# Patient Record
Sex: MALE | Race: WHITE | NOT HISPANIC OR LATINO | Employment: FULL TIME | URBAN - METROPOLITAN AREA
[De-identification: names, ages, dates, MRNs, and addresses within clinical notes are randomized per-mention and may not be internally consistent; named-entity substitution may affect disease eponyms.]

---

## 2023-06-03 ENCOUNTER — HOSPITAL ENCOUNTER (EMERGENCY)
Facility: CLINIC | Age: 33
Discharge: HOME | End: 2023-06-03
Attending: EMERGENCY MEDICINE | Admitting: EMERGENCY MEDICINE
Payer: COMMERCIAL

## 2023-06-03 VITALS
DIASTOLIC BLOOD PRESSURE: 98 MMHG | TEMPERATURE: 97.2 F | RESPIRATION RATE: 14 BRPM | SYSTOLIC BLOOD PRESSURE: 148 MMHG | WEIGHT: 190 LBS | HEART RATE: 98 BPM

## 2023-06-03 DIAGNOSIS — K62.5 RECTAL BLEEDING: ICD-10-CM

## 2023-06-03 DIAGNOSIS — K59.00 CONSTIPATION, UNSPECIFIED CONSTIPATION TYPE: ICD-10-CM

## 2023-06-03 PROCEDURE — 99282 EMERGENCY DEPT VISIT SF MDM: CPT | Performed by: EMERGENCY MEDICINE

## 2023-06-03 PROCEDURE — 99283 EMERGENCY DEPT VISIT LOW MDM: CPT | Performed by: EMERGENCY MEDICINE

## 2023-06-03 NOTE — ED PROVIDER NOTES
"  History     Chief Complaint   Patient presents with     Rectal Bleeding     HPI  Marco Antonio Jeffries is a 32 year old male who presents to the emergency department secondary to blood in the stool.  He states that he has developed \"medical anxiety \"lately and wants to get everything checked out.  He has a daughter who is 2010 today and he wanted to get this checked out before her party so he could get peace of mind.  He has intermittently had some bright red blood in his stool is always associated with constipation.  He did take some MiraLAX.  The blood in his stool today was after having a hard bowel movement.  He does not have significant rectal pain but believes he may have a hemorrhoid or something like that.  He googled his symptoms which gave him more anxiety so he decided to come in.  He has not been sick with fever chills nausea vomiting etc.  He has not had abdominal distention.  No dysuria or hematuria.  No chronic history of rectal pain and bleeding.  No history of Crohn's disease or ulcerative colitis.  No strong family history of colon cancer.    Allergies:  Allergies   Allergen Reactions     No Known Drug Allergy        Problem List:    There are no problems to display for this patient.       Past Medical History:    Past Medical History:   Diagnosis Date     NONSPECIFIC MEDICAL HISTORY 2000       Past Surgical History:    Past Surgical History:   Procedure Laterality Date     NO HISTORY OF SURGERY         Family History:    Family History   Problem Relation Age of Onset     Heart Disease Paternal Grandmother         CHF (smoker)     Heart Disease Maternal Grandfather      Arthritis Paternal Aunt      Obesity Paternal Aunt         stomach stapled       Social History:  Marital Status:  Single [1]  Social History     Tobacco Use     Smoking status: Never     Tobacco comments:     no parental exposure   Substance Use Topics     Alcohol use: No        Medications:    DILANTIN CAPS 100 MG " OR  FLINTSTONES COMPLETE CHEW   OR  NO ACTIVE MEDICATIONS  TRILEPTAL 300 MG OR TABS          Review of Systems   All other systems reviewed and are negative.      Physical Exam   BP: (!) 148/98  Pulse: 98  Temp: 97.2  F (36.2  C)  Resp: 14  Weight: 86.2 kg (190 lb)      Physical Exam  Vitals and nursing note reviewed.   Constitutional:       General: He is not in acute distress.     Appearance: Normal appearance. He is well-developed. He is not diaphoretic.   HENT:      Head: Normocephalic and atraumatic.      Right Ear: External ear normal.      Left Ear: External ear normal.      Nose: Nose normal.      Mouth/Throat:      Mouth: Mucous membranes are moist.   Eyes:      General: No scleral icterus.  Cardiovascular:      Rate and Rhythm: Normal rate.   Pulmonary:      Effort: Pulmonary effort is normal.   Genitourinary:     Prostate: Normal.      Rectum: Normal.      Comments: Tiny rectal polyp versus tiny external hemorrhoid has not bleeding.  Anus and rectum appear normal.  Rectal exam without any blood.  No masses appreciated.  Musculoskeletal:      Cervical back: Normal range of motion and neck supple.   Skin:     General: Skin is warm and dry.      Findings: No rash.   Neurological:      Mental Status: He is alert and oriented to person, place, and time.         ED Course                 Procedures                No results found for this or any previous visit (from the past 24 hour(s)).    Medications - No data to display    Assessments & Plan (with Medical Decision Making)  32-year-old male with constipation and had some blood in his stool.  He is not systemically ill.  He is does not have chronic symptoms.  This almost certainly is related to constipation.  He has had it in the past and each time it is associated with constipation and a hard bowel movement.  No lightheadedness or dizziness or chronic bleeding therefore blood work not done.  Patient states that he recently had blood work all of which looked  good.  He does not have a family history of colon cancer.  I advised him should this continue or become chronic he would need to have colonoscopy or sigmoidoscopy.  I advised him to drink plenty of water eat a high-fiber diet and avoid constipating foods.     I have reviewed the nursing notes.    I have reviewed the findings, diagnosis, plan and need for follow up with the patient.                New Prescriptions    No medications on file       Final diagnoses:   Rectal bleeding   Constipation, unspecified constipation type       6/3/2023   [facility] EMERGENCY DEPT     Zander Kirby MD  06/03/23 0881

## 2023-06-03 NOTE — ED TRIAGE NOTES
Noted blood in stool this AM. States he has trouble with constipation. He thinks it is probably a Hemorid but would like to be reassured.     Triage Assessment     Row Name 06/03/23 0758       Triage Assessment (Adult)    Airway WDL WDL       Respiratory WDL    Respiratory WDL WDL       Skin Circulation/Temperature WDL    Skin Circulation/Temperature WDL WDL       Cardiac WDL    Cardiac WDL WDL       Peripheral/Neurovascular WDL    Peripheral Neurovascular WDL WDL       Cognitive/Neuro/Behavioral WDL    Cognitive/Neuro/Behavioral WDL WDL

## 2023-06-03 NOTE — DISCHARGE INSTRUCTIONS
Most likely your blood in the stool is related to the constipation and is causing a small fissure with bleeding or perhaps you are bleeding from hemorrhoid.  I only saw a very tiny hemorrhoid near the rectum.  This could have been an anal polyp as well.  It was very small.  Focus on controlling her constipation with drinking plenty of water, eating a high-fiber diet, avoiding constipating foods, taking MiraLAX as needed.  If this continues you may need a colonoscopy or sigmoidoscopy.

## 2024-05-22 ENCOUNTER — HOSPITAL ENCOUNTER (EMERGENCY)
Facility: CLINIC | Age: 34
Discharge: HOME | End: 2024-05-22
Attending: EMERGENCY MEDICINE | Admitting: EMERGENCY MEDICINE
Payer: COMMERCIAL

## 2024-05-22 VITALS
SYSTOLIC BLOOD PRESSURE: 148 MMHG | TEMPERATURE: 98.4 F | HEART RATE: 90 BPM | DIASTOLIC BLOOD PRESSURE: 100 MMHG | OXYGEN SATURATION: 99 % | RESPIRATION RATE: 20 BRPM

## 2024-05-22 DIAGNOSIS — Z77.098 ACCIDENTAL EXPOSURE TO BLEACH: ICD-10-CM

## 2024-05-22 PROCEDURE — 99283 EMERGENCY DEPT VISIT LOW MDM: CPT | Performed by: EMERGENCY MEDICINE

## 2024-05-22 PROCEDURE — 250N000009 HC RX 250: Performed by: EMERGENCY MEDICINE

## 2024-05-22 RX ORDER — TOBRAMYCIN AND DEXAMETHASONE 3; 1 MG/ML; MG/ML
1 SUSPENSION/ DROPS OPHTHALMIC ONCE
Status: DISCONTINUED | OUTPATIENT
Start: 2024-05-22 | End: 2024-05-22 | Stop reason: HOSPADM

## 2024-05-22 RX ORDER — TETRACAINE HYDROCHLORIDE 5 MG/ML
1-2 SOLUTION OPHTHALMIC ONCE
Status: COMPLETED | OUTPATIENT
Start: 2024-05-22 | End: 2024-05-22

## 2024-05-22 RX ADMIN — TETRACAINE HYDROCHLORIDE 2 DROP: 5 SOLUTION OPHTHALMIC at 20:20

## 2024-05-22 ASSESSMENT — COLUMBIA-SUICIDE SEVERITY RATING SCALE - C-SSRS
2. HAVE YOU ACTUALLY HAD ANY THOUGHTS OF KILLING YOURSELF IN THE PAST MONTH?: NO
6. HAVE YOU EVER DONE ANYTHING, STARTED TO DO ANYTHING, OR PREPARED TO DO ANYTHING TO END YOUR LIFE?: NO
1. IN THE PAST MONTH, HAVE YOU WISHED YOU WERE DEAD OR WISHED YOU COULD GO TO SLEEP AND NOT WAKE UP?: NO

## 2024-05-22 ASSESSMENT — ACTIVITIES OF DAILY LIVING (ADL): ADLS_ACUITY_SCORE: 35

## 2024-05-23 NOTE — DISCHARGE INSTRUCTIONS
If you have decreased vision or pain in your eye when you wake up tomorrow call the above number to be seen tomorrow by an eye doctor.  If that number does not work call any eye doctor to be seen.  If all else fails return here.  Otherwise take the eyedrops 1 drop in your right eye every 4 hours while awake for 5 days.

## 2024-05-23 NOTE — ED TRIAGE NOTES
Triage Assessment (Adult)       Row Name 05/22/24 2009          Triage Assessment    Airway WDL WDL        Respiratory WDL    Respiratory WDL WDL                   Was shocking pool.  Chemical splashed into right eye.  Eye wash x20 minutes at home prior to coming in

## 2024-05-23 NOTE — ED PROVIDER NOTES
History     chief complaint  HPI  Patient is a 33-year-old healthy male present with chief complaint of bleach exposure to the right eye.  He was using it to shock his pool.  A splash got into his eye.  He then ran inside and irrigated out for 20 minutes in the shower.  He has slight irritation of the eye but no significant pain.  No vision changes.  No other symptoms.    Review of Systems:  All organ systems below were reviewed and are negative unless indicated in the HPI.    Constitutional  Eyes  Neuro    Allergies:  Allergies   Allergen Reactions    No Known Drug Allergy        Problem List:    There are no problems to display for this patient.       Past Medical History:    Past Medical History:   Diagnosis Date    NONSPECIFIC MEDICAL HISTORY 2000       Past Surgical History:    Past Surgical History:   Procedure Laterality Date    NO HISTORY OF SURGERY         Family History:    Family History   Problem Relation Age of Onset    Heart Disease Paternal Grandmother         CHF (smoker)    Heart Disease Maternal Grandfather     Arthritis Paternal Aunt     Obesity Paternal Aunt         stomach stapled       Medications:    DILANTIN CAPS 100 MG OR  FLINTSTONES COMPLETE CHEW   OR  NO ACTIVE MEDICATIONS  TRILEPTAL 300 MG OR TABS          Physical Exam   BP: (!) 148/100  Pulse: 90  Temp: 98.4  F (36.9  C)  Resp: 20  SpO2: 99 %      Gen: Vital signs reviewed  Eyes: Slight conjunctival injection bilaterally.  No fluorescein uptake on the right eye.  Anterior chamber quiet.  20/20 vision bilaterally.  pH is between 7 and 8 using the pH strips bilaterally.  ENT: External ears and nares normal  Card: Appears well-perfused  Resp: No respiratory distress.   Extremities: Without obvious deformity  Neuro: Alert.      ED Course        Procedures             No results found for this or any previous visit (from the past 24 hour(s)).    Medications   tobramycin-dexAMETHasone (TOBRADEX) ophthalmic susp 1 drop (has no  administration in time range)   tetracaine (PONTOCAINE) 0.5 % ophthalmic solution 1-2 drop (2 drops Both Eyes $Given 5/22/24 2020)         Consultations:  Ophthalmology    Social Determinants of Health:  Manages ADLs    Independent Review of Notes:  None  Assessments & Plan (with Medical Decision Making)       I have reviewed the nursing notes.    I have reviewed the findings, diagnosis, plan and need for follow up with the patient.      Medical Decision Making  On arrival, patient well-appearing.  Vision is preserved.  No corneal fluorescein uptake.  Anterior chamber looks normal.  pH is normal and symmetric.  He only had the sodium hypochlorite in his right eye.  Discussed case with ophthalmology.  Given his equal pH is an reassuring exam, they recommended antibiotic and steroid drop for several days.  This was given to him.  Also recommended that if he should have decreased vision or pain tomorrow he should follow-up with ophthalmology tomorrow.  Provided with the phone number to arrange this.  Discussed appropriate return precautions here and and he was discharged home in stable condition.    Final diagnoses:   Accidental exposure to bleach         Dony Bonilla M.D.   [facility] Emergency Department     Dony Bonilla MD  05/22/24 2049